# Patient Record
Sex: FEMALE | Race: BLACK OR AFRICAN AMERICAN | NOT HISPANIC OR LATINO
[De-identification: names, ages, dates, MRNs, and addresses within clinical notes are randomized per-mention and may not be internally consistent; named-entity substitution may affect disease eponyms.]

---

## 2017-11-14 ENCOUNTER — APPOINTMENT (OUTPATIENT)
Dept: OBGYN | Facility: CLINIC | Age: 32
End: 2017-11-14
Payer: COMMERCIAL

## 2017-11-14 VITALS
DIASTOLIC BLOOD PRESSURE: 80 MMHG | WEIGHT: 126 LBS | HEIGHT: 63 IN | BODY MASS INDEX: 22.32 KG/M2 | SYSTOLIC BLOOD PRESSURE: 100 MMHG

## 2017-11-14 DIAGNOSIS — D25.9 LEIOMYOMA OF UTERUS, UNSPECIFIED: ICD-10-CM

## 2017-11-14 DIAGNOSIS — Z01.419 ENCOUNTER FOR GYNECOLOGICAL EXAMINATION (GENERAL) (ROUTINE) W/OUT ABNORMAL FINDINGS: ICD-10-CM

## 2017-11-14 LAB
HCG UR QL: NEGATIVE
QUALITY CONTROL: YES

## 2017-11-14 PROCEDURE — 99395 PREV VISIT EST AGE 18-39: CPT

## 2017-11-14 PROCEDURE — 81025 URINE PREGNANCY TEST: CPT

## 2017-11-20 ENCOUNTER — RX RENEWAL (OUTPATIENT)
Age: 32
End: 2017-11-20

## 2018-10-05 ENCOUNTER — OUTPATIENT (OUTPATIENT)
Dept: OUTPATIENT SERVICES | Facility: HOSPITAL | Age: 33
LOS: 1 days | End: 2018-10-05

## 2018-10-05 VITALS
HEIGHT: 62 IN | WEIGHT: 117.07 LBS | SYSTOLIC BLOOD PRESSURE: 104 MMHG | DIASTOLIC BLOOD PRESSURE: 70 MMHG | HEART RATE: 76 BPM | RESPIRATION RATE: 16 BRPM | TEMPERATURE: 97 F

## 2018-10-05 DIAGNOSIS — D25.9 LEIOMYOMA OF UTERUS, UNSPECIFIED: ICD-10-CM

## 2018-10-05 LAB
BLD GP AB SCN SERPL QL: NEGATIVE — SIGNIFICANT CHANGE UP
HCT VFR BLD CALC: 38.4 % — SIGNIFICANT CHANGE UP (ref 34.5–45)
HGB BLD-MCNC: 12.8 G/DL — SIGNIFICANT CHANGE UP (ref 11.5–15.5)
MCHC RBC-ENTMCNC: 30.3 PG — SIGNIFICANT CHANGE UP (ref 27–34)
MCHC RBC-ENTMCNC: 33.3 % — SIGNIFICANT CHANGE UP (ref 32–36)
MCV RBC AUTO: 90.8 FL — SIGNIFICANT CHANGE UP (ref 80–100)
NRBC # FLD: 0 — SIGNIFICANT CHANGE UP
PLATELET # BLD AUTO: 295 K/UL — SIGNIFICANT CHANGE UP (ref 150–400)
PMV BLD: 8.8 FL — SIGNIFICANT CHANGE UP (ref 7–13)
RBC # BLD: 4.23 M/UL — SIGNIFICANT CHANGE UP (ref 3.8–5.2)
RBC # FLD: 12 % — SIGNIFICANT CHANGE UP (ref 10.3–14.5)
RH IG SCN BLD-IMP: POSITIVE — SIGNIFICANT CHANGE UP
WBC # BLD: 5.91 K/UL — SIGNIFICANT CHANGE UP (ref 3.8–10.5)
WBC # FLD AUTO: 5.91 K/UL — SIGNIFICANT CHANGE UP (ref 3.8–10.5)

## 2018-10-05 NOTE — H&P PST ADULT - NEUROLOGICAL DETAILS
alert and oriented x 3/responds to pain/sensation intact/responds to verbal commands/normal strength

## 2018-10-05 NOTE — H&P PST ADULT - PROBLEM SELECTOR PLAN 1
Scheduled for surgery 10/19/18  Preop instructions provided and patient verbalizes understanding.  Labs done and results pending.  Hibiclens and Famotidine provided with instructions.   UCG ordered am DOS

## 2018-10-05 NOTE — H&P PST ADULT - HISTORY OF PRESENT ILLNESS
Pt is a 33 yr old female scheduled for Hysteroscopy Abdominal Myomectomy with Dr Sahu 10/19/18. Pt c/o of uterine fibroids for 4-5 yrs and has increased pressure on bladder and discomfort from increasing size of fibroids. Pt denies bleeding at this time.

## 2018-10-18 ENCOUNTER — TRANSCRIPTION ENCOUNTER (OUTPATIENT)
Age: 33
End: 2018-10-18

## 2018-10-19 ENCOUNTER — INPATIENT (INPATIENT)
Facility: HOSPITAL | Age: 33
LOS: 1 days | Discharge: HOME | End: 2018-10-21
Attending: OBSTETRICS & GYNECOLOGY | Admitting: OBSTETRICS & GYNECOLOGY
Payer: COMMERCIAL

## 2018-10-19 ENCOUNTER — RESULT REVIEW (OUTPATIENT)
Age: 33
End: 2018-10-19

## 2018-10-19 VITALS
RESPIRATION RATE: 14 BRPM | WEIGHT: 117.07 LBS | SYSTOLIC BLOOD PRESSURE: 112 MMHG | HEIGHT: 62 IN | HEART RATE: 76 BPM | DIASTOLIC BLOOD PRESSURE: 67 MMHG | OXYGEN SATURATION: 100 % | TEMPERATURE: 98 F

## 2018-10-19 DIAGNOSIS — D25.9 LEIOMYOMA OF UTERUS, UNSPECIFIED: ICD-10-CM

## 2018-10-19 LAB
HCG UR QL: NEGATIVE — SIGNIFICANT CHANGE UP
RH IG SCN BLD-IMP: POSITIVE — SIGNIFICANT CHANGE UP

## 2018-10-19 PROCEDURE — 88305 TISSUE EXAM BY PATHOLOGIST: CPT | Mod: 26

## 2018-10-19 PROCEDURE — 58140 MYOMECTOMY ABDOM METHOD: CPT | Mod: 82

## 2018-10-19 RX ORDER — HEPARIN SODIUM 5000 [USP'U]/ML
5000 INJECTION INTRAVENOUS; SUBCUTANEOUS EVERY 12 HOURS
Qty: 0 | Refills: 0 | Status: DISCONTINUED | OUTPATIENT
Start: 2018-10-19 | End: 2018-10-21

## 2018-10-19 RX ORDER — SODIUM CHLORIDE 9 MG/ML
1000 INJECTION, SOLUTION INTRAVENOUS
Qty: 0 | Refills: 0 | Status: DISCONTINUED | OUTPATIENT
Start: 2018-10-19 | End: 2018-10-20

## 2018-10-19 RX ORDER — HYDROMORPHONE HYDROCHLORIDE 2 MG/ML
0.5 INJECTION INTRAMUSCULAR; INTRAVENOUS; SUBCUTANEOUS ONCE
Qty: 0 | Refills: 0 | Status: DISCONTINUED | OUTPATIENT
Start: 2018-10-19 | End: 2018-10-19

## 2018-10-19 RX ORDER — ONDANSETRON 8 MG/1
4 TABLET, FILM COATED ORAL EVERY 6 HOURS
Qty: 0 | Refills: 0 | Status: DISCONTINUED | OUTPATIENT
Start: 2018-10-19 | End: 2018-10-21

## 2018-10-19 RX ORDER — HYDROMORPHONE HYDROCHLORIDE 2 MG/ML
0.5 INJECTION INTRAMUSCULAR; INTRAVENOUS; SUBCUTANEOUS
Qty: 0 | Refills: 0 | Status: DISCONTINUED | OUTPATIENT
Start: 2018-10-19 | End: 2018-10-20

## 2018-10-19 RX ORDER — NALOXONE HYDROCHLORIDE 4 MG/.1ML
0.1 SPRAY NASAL
Qty: 0 | Refills: 0 | Status: DISCONTINUED | OUTPATIENT
Start: 2018-10-19 | End: 2018-10-21

## 2018-10-19 RX ORDER — SODIUM CHLORIDE 9 MG/ML
1000 INJECTION, SOLUTION INTRAVENOUS
Qty: 0 | Refills: 0 | Status: DISCONTINUED | OUTPATIENT
Start: 2018-10-19 | End: 2018-10-19

## 2018-10-19 RX ORDER — ONDANSETRON 8 MG/1
4 TABLET, FILM COATED ORAL ONCE
Qty: 0 | Refills: 0 | Status: DISCONTINUED | OUTPATIENT
Start: 2018-10-19 | End: 2018-10-19

## 2018-10-19 RX ORDER — HYDROMORPHONE HYDROCHLORIDE 2 MG/ML
30 INJECTION INTRAMUSCULAR; INTRAVENOUS; SUBCUTANEOUS
Qty: 0 | Refills: 0 | Status: DISCONTINUED | OUTPATIENT
Start: 2018-10-19 | End: 2018-10-20

## 2018-10-19 RX ADMIN — HYDROMORPHONE HYDROCHLORIDE 30 MILLILITER(S): 2 INJECTION INTRAMUSCULAR; INTRAVENOUS; SUBCUTANEOUS at 22:08

## 2018-10-19 RX ADMIN — ONDANSETRON 4 MILLIGRAM(S): 8 TABLET, FILM COATED ORAL at 22:38

## 2018-10-19 RX ADMIN — HYDROMORPHONE HYDROCHLORIDE 0.5 MILLIGRAM(S): 2 INJECTION INTRAMUSCULAR; INTRAVENOUS; SUBCUTANEOUS at 17:15

## 2018-10-19 RX ADMIN — HEPARIN SODIUM 5000 UNIT(S): 5000 INJECTION INTRAVENOUS; SUBCUTANEOUS at 18:26

## 2018-10-19 RX ADMIN — HYDROMORPHONE HYDROCHLORIDE 0.5 MILLIGRAM(S): 2 INJECTION INTRAMUSCULAR; INTRAVENOUS; SUBCUTANEOUS at 17:30

## 2018-10-19 RX ADMIN — SODIUM CHLORIDE 125 MILLILITER(S): 9 INJECTION, SOLUTION INTRAVENOUS at 17:00

## 2018-10-19 RX ADMIN — HYDROMORPHONE HYDROCHLORIDE 30 MILLILITER(S): 2 INJECTION INTRAMUSCULAR; INTRAVENOUS; SUBCUTANEOUS at 17:35

## 2018-10-19 NOTE — PROGRESS NOTE ADULT - SUBJECTIVE AND OBJECTIVE BOX
Subjective  Patient evaluated at bedside. No post operative events.  Pain well controlled.  Tolerating ice-chips.  Not out of bed.  No flatus.  [place] in place.  Patient denies chest pain, shortness of breath, fevers, chills, nausea, vomiting, diarrhea.      heparin  Injectable 5000 Unit(s) SubCutaneous every 12 hours  HYDROmorphone PCA (1 mG/mL) 30 milliLiter(s) PCA Continuous PCA Continuous  HYDROmorphone PCA (1 mG/mL) Rescue Clinician Bolus 0.5 milliGRAM(s) IV Push every 15 minutes PRN  lactated ringers. 1000 milliLiter(s) IV Continuous <Continuous>  naloxone Injectable 0.1 milliGRAM(s) IV Push every 3 minutes PRN  ondansetron Injectable 4 milliGRAM(s) IV Push every 6 hours PRN  ondansetron Injectable 4 milliGRAM(s) IV Push Once PRN      Objective  Physical exam  VS: T(C): 36.9 (10-19-18 @ 22:14), Max: 36.9 (10-19-18 @ 22:14)  HR: 77 (10-19-18 @ 22:14) (71 - 85)  BP: 113/77 (10-19-18 @ 22:14) (96/77 - 118/79)  RR: 16 (10-19-18 @ 22:14) (10 - 19)  SpO2: 100% (10-19-18 @ 22:14) (96% - 100%)  Gen: No acute distress, alert and oriented  CV: Regular rate and rhythm  Pulm: Clear to ascultation bilaterally  Abd: Pfannenstiel incision c/d/i with dermabond in place  Ext: nontender bilaterally, SCDs in place      10-19 @ 07:01  -  10-19 @ 22:23  --------------------------------------------------------  IN: 715 mL / OUT: 400 mL / NET: 315 mL

## 2018-10-20 LAB
BUN SERPL-MCNC: 10 MG/DL — SIGNIFICANT CHANGE UP (ref 7–23)
CALCIUM SERPL-MCNC: 8.8 MG/DL — SIGNIFICANT CHANGE UP (ref 8.4–10.5)
CHLORIDE SERPL-SCNC: 101 MMOL/L — SIGNIFICANT CHANGE UP (ref 98–107)
CO2 SERPL-SCNC: 21 MMOL/L — LOW (ref 22–31)
CREAT SERPL-MCNC: 0.77 MG/DL — SIGNIFICANT CHANGE UP (ref 0.5–1.3)
GLUCOSE SERPL-MCNC: 94 MG/DL — SIGNIFICANT CHANGE UP (ref 70–99)
HCT VFR BLD CALC: 32 % — LOW (ref 34.5–45)
HGB BLD-MCNC: 10.8 G/DL — LOW (ref 11.5–15.5)
MAGNESIUM SERPL-MCNC: 1.6 MG/DL — SIGNIFICANT CHANGE UP (ref 1.6–2.6)
MCHC RBC-ENTMCNC: 29.7 PG — SIGNIFICANT CHANGE UP (ref 27–34)
MCHC RBC-ENTMCNC: 33.8 % — SIGNIFICANT CHANGE UP (ref 32–36)
MCV RBC AUTO: 87.9 FL — SIGNIFICANT CHANGE UP (ref 80–100)
NRBC # FLD: 0 — SIGNIFICANT CHANGE UP
PHOSPHATE SERPL-MCNC: 3.9 MG/DL — SIGNIFICANT CHANGE UP (ref 2.5–4.5)
PLATELET # BLD AUTO: 260 K/UL — SIGNIFICANT CHANGE UP (ref 150–400)
PMV BLD: 8.6 FL — SIGNIFICANT CHANGE UP (ref 7–13)
POTASSIUM SERPL-MCNC: 4.1 MMOL/L — SIGNIFICANT CHANGE UP (ref 3.5–5.3)
POTASSIUM SERPL-SCNC: 4.1 MMOL/L — SIGNIFICANT CHANGE UP (ref 3.5–5.3)
RBC # BLD: 3.64 M/UL — LOW (ref 3.8–5.2)
RBC # FLD: 11.7 % — SIGNIFICANT CHANGE UP (ref 10.3–14.5)
SODIUM SERPL-SCNC: 137 MMOL/L — SIGNIFICANT CHANGE UP (ref 135–145)
WBC # BLD: 15 K/UL — HIGH (ref 3.8–10.5)
WBC # FLD AUTO: 15 K/UL — HIGH (ref 3.8–10.5)

## 2018-10-20 RX ORDER — INFLUENZA VIRUS VACCINE 15; 15; 15; 15 UG/.5ML; UG/.5ML; UG/.5ML; UG/.5ML
0.5 SUSPENSION INTRAMUSCULAR ONCE
Qty: 0 | Refills: 0 | Status: COMPLETED | OUTPATIENT
Start: 2018-10-20 | End: 2018-10-20

## 2018-10-20 RX ORDER — SIMETHICONE 80 MG/1
80 TABLET, CHEWABLE ORAL THREE TIMES A DAY
Qty: 0 | Refills: 0 | Status: DISCONTINUED | OUTPATIENT
Start: 2018-10-20 | End: 2018-10-21

## 2018-10-20 RX ORDER — ACETAMINOPHEN 500 MG
650 TABLET ORAL EVERY 6 HOURS
Qty: 0 | Refills: 0 | Status: DISCONTINUED | OUTPATIENT
Start: 2018-10-20 | End: 2018-10-21

## 2018-10-20 RX ORDER — IBUPROFEN 200 MG
600 TABLET ORAL EVERY 6 HOURS
Qty: 0 | Refills: 0 | Status: DISCONTINUED | OUTPATIENT
Start: 2018-10-20 | End: 2018-10-21

## 2018-10-20 RX ORDER — OXYCODONE HYDROCHLORIDE 5 MG/1
10 TABLET ORAL EVERY 4 HOURS
Qty: 0 | Refills: 0 | Status: DISCONTINUED | OUTPATIENT
Start: 2018-10-20 | End: 2018-10-21

## 2018-10-20 RX ORDER — OXYCODONE HYDROCHLORIDE 5 MG/1
5 TABLET ORAL EVERY 4 HOURS
Qty: 0 | Refills: 0 | Status: DISCONTINUED | OUTPATIENT
Start: 2018-10-20 | End: 2018-10-21

## 2018-10-20 RX ADMIN — Medication 650 MILLIGRAM(S): at 18:47

## 2018-10-20 RX ADMIN — ONDANSETRON 4 MILLIGRAM(S): 8 TABLET, FILM COATED ORAL at 09:44

## 2018-10-20 RX ADMIN — Medication 650 MILLIGRAM(S): at 12:23

## 2018-10-20 RX ADMIN — SIMETHICONE 80 MILLIGRAM(S): 80 TABLET, CHEWABLE ORAL at 22:05

## 2018-10-20 RX ADMIN — HEPARIN SODIUM 5000 UNIT(S): 5000 INJECTION INTRAVENOUS; SUBCUTANEOUS at 18:17

## 2018-10-20 RX ADMIN — HEPARIN SODIUM 5000 UNIT(S): 5000 INJECTION INTRAVENOUS; SUBCUTANEOUS at 05:24

## 2018-10-20 RX ADMIN — HYDROMORPHONE HYDROCHLORIDE 30 MILLILITER(S): 2 INJECTION INTRAMUSCULAR; INTRAVENOUS; SUBCUTANEOUS at 08:26

## 2018-10-20 RX ADMIN — Medication 650 MILLIGRAM(S): at 18:17

## 2018-10-20 RX ADMIN — Medication 600 MILLIGRAM(S): at 12:53

## 2018-10-20 RX ADMIN — Medication 600 MILLIGRAM(S): at 18:47

## 2018-10-20 RX ADMIN — Medication 600 MILLIGRAM(S): at 12:23

## 2018-10-20 RX ADMIN — Medication 600 MILLIGRAM(S): at 18:17

## 2018-10-20 RX ADMIN — Medication 650 MILLIGRAM(S): at 12:53

## 2018-10-20 RX ADMIN — SODIUM CHLORIDE 125 MILLILITER(S): 9 INJECTION, SOLUTION INTRAVENOUS at 08:26

## 2018-10-20 NOTE — PROGRESS NOTE ADULT - SUBJECTIVE AND OBJECTIVE BOX
POD#   HD#    Patient seen and examined at bedside, no acute overnight events. No acute complaints, pain well controlled.  Patient is ambulating, passing flatus, voiding spontaneously, and tolerating regular diet. Denies CP, SOB, N/V, fevers, and chills.    Vital Signs Last 24 Hours  T(C): 36.8 (10-20-18 @ 05:23), Max: 36.9 (10-19-18 @ 22:14)  HR: 84 (10-20-18 @ 05:23) (71 - 85)  BP: 96/57 (10-20-18 @ 05:23) (93/59 - 118/79)  RR: 18 (10-20-18 @ 05:23) (10 - 19)  SpO2: 100% (10-20-18 @ 05:23) (96% - 100%)    I&O's Summary    19 Oct 2018 07:01  -  20 Oct 2018 07:00  --------------------------------------------------------  IN: 715 mL / OUT: 700 mL / NET: 15 mL        Physical Exam:  General: NAD  CV: NR, RR, S1, S2, no M/R/G  Lungs: CTA-B  Abdomen: Soft, non-tender, non-distended, +BS  Incision: _ CDI  Ext: No pain or swelling    Labs:                Blood Type: O Positive      MEDICATIONS  (STANDING):  heparin  Injectable 5000 Unit(s) SubCutaneous every 12 hours  HYDROmorphone PCA (1 mG/mL) 30 milliLiter(s) PCA Continuous PCA Continuous  influenza   Vaccine 0.5 milliLiter(s) IntraMuscular once  lactated ringers. 1000 milliLiter(s) (125 mL/Hr) IV Continuous <Continuous>    MEDICATIONS  (PRN):  HYDROmorphone PCA (1 mG/mL) Rescue Clinician Bolus 0.5 milliGRAM(s) IV Push every 15 minutes PRN for Pain Scale GREATER THAN 6  naloxone Injectable 0.1 milliGRAM(s) IV Push every 3 minutes PRN For ANY of the following changes in patient status:  A. RR LESS THAN 10 breaths per minute, B. Oxygen saturation LESS THAN 90%, C. Sedation score of 6  ondansetron Injectable 4 milliGRAM(s) IV Push every 6 hours PRN Nausea R2 GYN PROGRESS NOTE    POD#1   HD#2    SUBJECTIVE:  Patient seen and examined at bedside, no acute overnight events. No acute complaints, pain well controlled.  Patient is out of bed to commode, voiding spontaneously, and tolerating regular diet. Denies CP, SOB, N/V, fevers, and chills.    OBJECTIVE:  Vital Signs Last 24 Hours  T(C): 36.8 (10-20-18 @ 05:23), Max: 36.9 (10-19-18 @ 22:14)  HR: 84 (10-20-18 @ 05:23) (71 - 85)  BP: 96/57 (10-20-18 @ 05:23) (93/59 - 118/79)  RR: 18 (10-20-18 @ 05:23) (10 - 19)  SpO2: 100% (10-20-18 @ 05:23) (96% - 100%)    I&O's Summary    19 Oct 2018 07:01  -  20 Oct 2018 07:00  --------------------------------------------------------  IN: 715 mL / OUT: 700 mL / NET: 15 mL    Physical Exam:  General: NAD  CV: NR, RR, S1, S2, no M/R/G  Lungs: CTA-B  Abdomen: Soft, non-distended, +BS, appropriately tender  Incision: pfannensteil incision c/d/i  Ext: No pain or swelling    Labs:                          10.8   15.00 )-----------( 260      ( 20 Oct 2018 07:40 )             32.0       10-20  137  |  101  |  10  ----------------------------<  94  4.1   |  21<L>  |  0.77    Ca    8.8      20 Oct 2018 07:40  Phos  3.9     10-20  Mg     1.6     10-20    Blood Type: O Positive      MEDICATIONS  (STANDING):  heparin  Injectable 5000 Unit(s) SubCutaneous every 12 hours  HYDROmorphone PCA (1 mG/mL) 30 milliLiter(s) PCA Continuous PCA Continuous  influenza   Vaccine 0.5 milliLiter(s) IntraMuscular once  lactated ringers. 1000 milliLiter(s) (125 mL/Hr) IV Continuous <Continuous>    MEDICATIONS  (PRN):  HYDROmorphone PCA (1 mG/mL) Rescue Clinician Bolus 0.5 milliGRAM(s) IV Push every 15 minutes PRN for Pain Scale GREATER THAN 6  naloxone Injectable 0.1 milliGRAM(s) IV Push every 3 minutes PRN For ANY of the following changes in patient status:  A. RR LESS THAN 10 breaths per minute, B. Oxygen saturation LESS THAN 90%, C. Sedation score of 6  ondansetron Injectable 4 milliGRAM(s) IV Push every 6 hours PRN Nausea

## 2018-10-20 NOTE — PROGRESS NOTE ADULT - SUBJECTIVE AND OBJECTIVE BOX
Anesthesia Pain Management Service    SUBJECTIVE: Patient is doing well with IV PCA and no significant problems reported.    Pain Scale Score	At rest: ___ 	With Activity: ___ 	[X ] Refer to charted pain scores    THERAPY:    [ ] IV PCA Morphine		[ ] 5 mg/mL	[ ] 1 mg/mL  [X ] IV PCA Hydromorphone	[ ] 5 mg/mL	[X ] 1 mg/mL  [ ] IV PCA Fentanyl		[ ] 50 micrograms/mL    Demand dose __0.2_ lockout __6_ (minutes) Continuous Rate _0__ Total: __7.2_  mg used (in past 24 hours)      MEDICATIONS  (STANDING):  acetaminophen   Tablet .. 650 milliGRAM(s) Oral every 6 hours  heparin  Injectable 5000 Unit(s) SubCutaneous every 12 hours  HYDROmorphone PCA (1 mG/mL) 30 milliLiter(s) PCA Continuous PCA Continuous  influenza   Vaccine 0.5 milliLiter(s) IntraMuscular once  lactated ringers. 1000 milliLiter(s) (125 mL/Hr) IV Continuous <Continuous>    MEDICATIONS  (PRN):  HYDROmorphone PCA (1 mG/mL) Rescue Clinician Bolus 0.5 milliGRAM(s) IV Push every 15 minutes PRN for Pain Scale GREATER THAN 6  naloxone Injectable 0.1 milliGRAM(s) IV Push every 3 minutes PRN For ANY of the following changes in patient status:  A. RR LESS THAN 10 breaths per minute, B. Oxygen saturation LESS THAN 90%, C. Sedation score of 6  ondansetron Injectable 4 milliGRAM(s) IV Push every 6 hours PRN Nausea      OBJECTIVE:    Sedation Score:	[ X] Alert	[ ] Drowsy 	[ ] Arousable	[ ] Asleep	[ ] Unresponsive    Side Effects:	[X ] None	[ ] Nausea	[ ] Vomiting	[ ] Pruritus  		[ ] Other:    Vital Signs Last 24 Hrs  T(C): 36.8 (20 Oct 2018 10:37), Max: 36.9 (19 Oct 2018 22:14)  T(F): 98.2 (20 Oct 2018 10:37), Max: 98.4 (19 Oct 2018 22:14)  HR: 74 (20 Oct 2018 10:37) (71 - 85)  BP: 90/54 (20 Oct 2018 10:37) (90/54 - 118/79)  BP(mean): 75 (19 Oct 2018 18:00) (75 - 75)  RR: 17 (20 Oct 2018 10:37) (10 - 19)  SpO2: 100% (20 Oct 2018 10:37) (96% - 100%)    ASSESSMENT/ PLAN    Therapy to  be:	[ X] Continue   [ ] Discontinued   [ ] Change to prn Analgesics    Documentation and Verification of current medications:   [X] Done	[ ] Not done, not elligible    Comments: Pt wants to keep IV PCA for now.    Progress Note written now but Patient was seen earlier.

## 2018-10-20 NOTE — PROGRESS NOTE ADULT - SUBJECTIVE AND OBJECTIVE BOX
ANESTHESIA POSTOP CHECK    33y Female POSTOP DAY 1 S/P     Vital Signs Last 24 Hrs  T(C): 36.8 (20 Oct 2018 05:23), Max: 36.9 (19 Oct 2018 22:14)  T(F): 98.3 (20 Oct 2018 05:23), Max: 98.4 (19 Oct 2018 22:14)  HR: 84 (20 Oct 2018 05:23) (71 - 85)  BP: 96/57 (20 Oct 2018 05:23) (93/59 - 118/79)  BP(mean): 75 (19 Oct 2018 18:00) (75 - 75)  RR: 18 (20 Oct 2018 05:23) (10 - 19)  SpO2: 100% (20 Oct 2018 05:23) (96% - 100%)  I&O's Summary    19 Oct 2018 07:01  -  20 Oct 2018 07:00  --------------------------------------------------------  IN: 715 mL / OUT: 700 mL / NET: 15 mL        [x ] NO APPARENT ANESTHESIA COMPLICATIONS      Comments:

## 2018-10-20 NOTE — PROGRESS NOTE ADULT - SUBJECTIVE AND OBJECTIVE BOX
ANESTHESIA POSTOP CHECK    33y Female POSTOP DAY 1 S/P     Vital Signs Last 24 Hrs  T(C): 36.8 (20 Oct 2018 10:37), Max: 36.9 (19 Oct 2018 22:14)  T(F): 98.2 (20 Oct 2018 10:37), Max: 98.4 (19 Oct 2018 22:14)  HR: 74 (20 Oct 2018 10:37) (71 - 85)  BP: 90/54 (20 Oct 2018 10:37) (90/54 - 118/79)  BP(mean): 75 (19 Oct 2018 18:00) (75 - 75)  RR: 17 (20 Oct 2018 10:37) (10 - 19)  SpO2: 100% (20 Oct 2018 10:37) (96% - 100%)  I&O's Summary    19 Oct 2018 07:01  -  20 Oct 2018 07:00  --------------------------------------------------------  IN: 715 mL / OUT: 700 mL / NET: 15 mL        [X ] NO APPARENT ANESTHESIA COMPLICATIONS      Comments:

## 2018-10-21 ENCOUNTER — TRANSCRIPTION ENCOUNTER (OUTPATIENT)
Age: 33
End: 2018-10-21

## 2018-10-21 VITALS
DIASTOLIC BLOOD PRESSURE: 64 MMHG | TEMPERATURE: 98 F | HEART RATE: 81 BPM | SYSTOLIC BLOOD PRESSURE: 107 MMHG | RESPIRATION RATE: 18 BRPM | OXYGEN SATURATION: 100 %

## 2018-10-21 DIAGNOSIS — Z98.890 OTHER SPECIFIED POSTPROCEDURAL STATES: ICD-10-CM

## 2018-10-21 RX ORDER — ACETAMINOPHEN 500 MG
2 TABLET ORAL
Qty: 0 | Refills: 0 | COMMUNITY
Start: 2018-10-21

## 2018-10-21 RX ORDER — NORETHINDRONE 0.35 MG/1
1 TABLET ORAL
Qty: 0 | Refills: 0 | COMMUNITY

## 2018-10-21 RX ORDER — OXYCODONE HYDROCHLORIDE 5 MG/1
1 TABLET ORAL
Qty: 15 | Refills: 0 | OUTPATIENT
Start: 2018-10-21

## 2018-10-21 RX ORDER — IBUPROFEN 200 MG
1 TABLET ORAL
Qty: 0 | Refills: 0 | COMMUNITY
Start: 2018-10-21

## 2018-10-21 RX ADMIN — Medication 650 MILLIGRAM(S): at 08:00

## 2018-10-21 RX ADMIN — Medication 600 MILLIGRAM(S): at 00:10

## 2018-10-21 RX ADMIN — Medication 650 MILLIGRAM(S): at 00:10

## 2018-10-21 RX ADMIN — Medication 600 MILLIGRAM(S): at 01:00

## 2018-10-21 RX ADMIN — Medication 650 MILLIGRAM(S): at 07:00

## 2018-10-21 RX ADMIN — Medication 600 MILLIGRAM(S): at 08:00

## 2018-10-21 RX ADMIN — Medication 650 MILLIGRAM(S): at 01:00

## 2018-10-21 RX ADMIN — SIMETHICONE 80 MILLIGRAM(S): 80 TABLET, CHEWABLE ORAL at 07:01

## 2018-10-21 RX ADMIN — Medication 600 MILLIGRAM(S): at 13:05

## 2018-10-21 RX ADMIN — Medication 600 MILLIGRAM(S): at 07:01

## 2018-10-21 RX ADMIN — Medication 650 MILLIGRAM(S): at 13:05

## 2018-10-21 RX ADMIN — SIMETHICONE 80 MILLIGRAM(S): 80 TABLET, CHEWABLE ORAL at 13:06

## 2018-10-21 NOTE — PROGRESS NOTE ADULT - PROBLEM SELECTOR PLAN 1
NEURO: continue PO pain meds  CV: vss, Hct trend 38.4 -> 32.0  PULM: satting well on RA, lungs clear, increase incentive spirometry and ambulation  GI: continue regular diet, SLIV  : cohn out; pt voiding  HEME: continue scds, HSQ  ID: afebrile  DISPO: likely discharge today    KYLIE Gan MD PGY2.
NEURO: d/c PCA, transition to PO pain meds  CV: vss, Hct trend 38.4 -> 32.0  PULM: satting well on RA, lungs clear, increase incentive spirometry and ambulation  GI: continue regular diet, SLIV for good po/voiding  : cohn out; pt voiding  HEME: continue scds, HSQ  ID: aferile  DISPO: continue routien postop care    KYLIE Gan MD PGY2

## 2018-10-21 NOTE — DISCHARGE NOTE ADULT - CARE PROVIDER_API CALL
Constanza Sahu), Obstetrics and Gynecology  [address]  [address]  [place] [address]  Phone: [phone]  Fax: [phone]

## 2018-10-21 NOTE — PROGRESS NOTE ADULT - ASSESSMENT
33y F s/p hysteroscopy, abdominal myomectomy.  Patient is stable and progressing normally postop, meeting all milestones.
A/P  33yF POD#0 s/p hysteroscopy, abdominal myomectomy, currently meeting appropriate post op milestones    Neuro: Pain controlled with PCA  CV: Hemodynamically stable, follow up AM CBC  Pulm: Oxygenating well on room air, continue IS  GI: Tolerating ice chips, will advance to regular diet  : Adequate UOP, continue to monitor I's/O's.  Discontinuing cohn.  Follow up BMP/Mg/Phos  ID: afebrile, WBC stable, follow up AM CBC  Heme: DVT prophylaxis with HSQ, early ambulation and SCDs    Shalnoda Deleon PGY4  [id number]
33y now s/p hysteroscopy abdominal myomectomy (ebl 50); pt is progressing normally postop.

## 2018-10-21 NOTE — DISCHARGE NOTE ADULT - ADDITIONAL INSTRUCTIONS
Follow up with Dr. Sahu in two weeks for your postop checkup.  Call the office at [phone] for your appointment.

## 2018-10-21 NOTE — DISCHARGE NOTE ADULT - MEDICATION SUMMARY - MEDICATIONS TO TAKE
I will START or STAY ON the medications listed below when I get home from the hospital:    acetaminophen 325 mg oral tablet  -- 2 tab(s) by mouth every 6 hours  -- Indication: For mild pain    ibuprofen 600 mg oral tablet  -- 1 tab(s) by mouth every 6 hours  -- Indication: For moderate pain    oxyCODONE 5 mg oral tablet  -- 1 tab(s) by mouth 4 times a day, As Needed -for severe pain MDD:4 tabs   -- Caution federal law prohibits the transfer of this drug to any person other  than the person for whom it was prescribed.  It is very important that you take or use this exactly as directed.  Do not skip doses or discontinue unless directed by your doctor.  May cause drowsiness.  Alcohol may intensify this effect.  Use care when operating dangerous machinery.  This prescription cannot be refilled.  Using more of this medication than prescribed may cause serious breathing problems.    -- Indication: For Severe pain

## 2018-10-21 NOTE — DISCHARGE NOTE ADULT - HOSPITAL COURSE
Patient had uncomplicated hysteroscopy and abdominal myomectomy on 10/19/18.  EBL was 200cc.  Hct trend: 38.4->32.0.  Postoperative course was uncomplicated.  On the day of discharge the patient was stable and afebrile, voiding spontaneously, tolerating regular diet, ambulating at baseline and had pain well controlled with oral pain medications.  Patient to have close follow up with Dr. Sahu outpatient. Patient had uncomplicated hysteroscopy and abdominal myomectomy on 10/19/18.  EBL was 50cc.  Hct trend: 38.4->32.0.  Postoperative course was uncomplicated.  On the day of discharge the patient was stable and afebrile, voiding spontaneously, tolerating regular diet, ambulating at baseline and had pain well controlled with oral pain medications.  Patient to have close follow up with Dr. Sahu outpatient.

## 2018-10-21 NOTE — PROGRESS NOTE ADULT - SUBJECTIVE AND OBJECTIVE BOX
R2 GYN PROGRESS NOTE    POD#2   HD#3    SUBJECTIVE:    Patient seen and examined at bedside, no acute overnight events. No acute complaints, pain well controlled.  Patient is ambulating, passing flatus, voiding spontaneously, and tolerating regular diet. Denies CP, SOB, N/V, fevers, and chills.    OBJECTIVE:  Vital Signs Last 24 Hours  T(C): 36.8 (10-21-18 @ 06:57), Max: 37.3 (10-21-18 @ 01:31)  HR: 75 (10-21-18 @ 06:57) (74 - 88)  BP: 104/57 (10-21-18 @ 06:57) (90/54 - 105/71)  RR: 18 (10-21-18 @ 06:57) (16 - 18)  SpO2: 98% (10-21-18 @ 06:57) (98% - 100%)    I&O's Summary    20 Oct 2018 07:01  -  21 Oct 2018 07:00  --------------------------------------------------------  IN: 0 mL / OUT: 0 mL / NET: 0 mL    Physical Exam:  General: NAD  CV: NR, RR, S1, S2, no M/R/G  Lungs: CTA-B  Abdomen: Soft, non-tender, non-distended, +BS  Incision: pfannensteil incision c/d/i w/ dermabond in place  Ext: No pain or swelling    Labs:                        10.8   15.00 )-----------( 260      ( 20 Oct 2018 07:40 )             32.0        10-20    137  |  101  |  10  ----------------------------<  94  4.1   |  21<L>  |  0.77    Ca    8.8      20 Oct 2018 07:40  Phos  3.9     10-20  Mg     1.6     10-20    Blood Type: O Positive    MEDICATIONS  (STANDING):  acetaminophen   Tablet .. 650 milliGRAM(s) Oral every 6 hours  heparin  Injectable 5000 Unit(s) SubCutaneous every 12 hours  ibuprofen  Tablet. 600 milliGRAM(s) Oral every 6 hours  influenza   Vaccine 0.5 milliLiter(s) IntraMuscular once  simethicone 80 milliGRAM(s) Chew three times a day    MEDICATIONS  (PRN):  naloxone Injectable 0.1 milliGRAM(s) IV Push every 3 minutes PRN For ANY of the following changes in patient status:  A. RR LESS THAN 10 breaths per minute, B. Oxygen saturation LESS THAN 90%, C. Sedation score of 6  ondansetron Injectable 4 milliGRAM(s) IV Push every 6 hours PRN Nausea  oxyCODONE    IR 5 milliGRAM(s) Oral every 4 hours PRN Moderate Pain (4 - 6)  oxyCODONE    IR 10 milliGRAM(s) Oral every 4 hours PRN Severe Pain (7 - 10)

## 2018-10-21 NOTE — DISCHARGE NOTE ADULT - CARE PROVIDERS DIRECT ADDRESSES
[email or web address]

## 2018-10-21 NOTE — DISCHARGE NOTE ADULT - CARE PLAN
Principal Discharge DX:	S/P myomectomy  Goal:	Recovery  Assessment and plan of treatment:	1. Nothing in the vagina for 6 weeks including no tampons, no douching, no tub baths and no intercourse.  2. Please call the office or go to the Emergency Room if you have any of the following: fever over 100.4F, pain not controlled by oral pain medications, difficulty urinating, severe vaginal bleeding more than 1 pad per hour, or for any other concerns.

## 2018-10-21 NOTE — DISCHARGE NOTE ADULT - PLAN OF CARE
Recovery 1. Nothing in the vagina for 6 weeks including no tampons, no douching, no tub baths and no intercourse.  2. Please call the office or go to the Emergency Room if you have any of the following: fever over 100.4F, pain not controlled by oral pain medications, difficulty urinating, severe vaginal bleeding more than 1 pad per hour, or for any other concerns.

## 2018-10-21 NOTE — DISCHARGE NOTE ADULT - PATIENT PORTAL LINK FT
You can access the [facility] Patient Portal, offered by [facility], by registering with the following website: [email or web address]

## 2018-10-24 LAB — SURGICAL PATHOLOGY STUDY: SIGNIFICANT CHANGE UP

## 2018-11-07 ENCOUNTER — TRANSCRIPTION ENCOUNTER (OUTPATIENT)
Age: 33
End: 2018-11-07

## 2021-07-24 NOTE — H&P PST ADULT - NS PRO LAST MENSTRUAL DATE
Hospitalist Progress Note    NAME: Jarrett Andrews   :  1983   MRN:  [id number]       Assessment / Plan:  Acute hypoxic respiratory failure POA  Covid-19 PNA  -S/p IV remdesivir  -cont IV Decadron  -On HFNC O2 support, weaning down, currently on 12 L  -Patient no distress, speaking full sentences no accessory muscle use  -Continue PT/OT  -Sinus tachycardia 2/2 respiratory issues, had brief episodic bradycardia few days ago, likely vagally mediated  -Mobilize  -We will need O2 screening prior to DC  -Continue to wean O2 as able maintaining sats > 90  -will need ambulatory O2 screening prior to DC    Obesity  Body mass index is 47.65 kg/m². -weight loss counseling    Estimated discharge date:     Code status: Full  Prophylaxis: Lovenox     Subjective:     Chief Complaint / Reason for Physician Visit  Weaning O2, maintaining sats at rest on 6L during rounds    discussed with RN events overnight. Review of Systems:  Symptom Y/N Comments  Symptom Y/N Comments   Fever/Chills n   Chest Pain n    Poor Appetite n   Edema     Cough n   Abdominal Pain n    Sputum n   Joint Pain     SOB/BROOKS n   Pruritis/Rash     Nausea/vomit n   Tolerating PT/OT     Diarrhea    Tolerating Diet y    Constipation    Other       Could NOT obtain due to:      Objective:     VITALS:   Last 24hrs VS reviewed since prior progress note.  Most recent are:  Patient Vitals for the past 24 hrs:   Temp Pulse Resp BP SpO2   21 1100 98.6 °F (37 °C) (!) 113 29 114/74 97 %   21 0838     95 %   21 0833     96 %   21 0831     95 %   21 0530  94 30 (!) 104/57 96 %   21 0410 97.9 °F (36.6 °C) (!) 101 (!) 38 (!) 104/57 97 %   21 0338     91 %   21 0010  70 22  98 %   21 2310  90 (!) 32  99 %   21 2225 98.4 °F (36.9 °C) 80 26 115/82 96 %   21 2210  77 (!) 34  99 %   210  86 26  98 %   21     97 %   21  94 (!) 35  99 %   07/23/21 1946 98.4 °F (36.9 °C) (!) 107 30 122/81 98 %   07/23/21 1810  100 (!) 33  93 %   07/23/21 1718 98.7 °F (37.1 °C) (!) 115 28 107/84 94 %   07/23/21 1610  (!) 121 (!) 37  97 %   07/23/21 1517     98 %   07/23/21 1510  90 27  98 %   07/23/21 1410  100 (!) 32  97 %   07/23/21 1400  (!) 104 (!) 31  96 %   07/23/21 1333     98 %   07/23/21 1310  79 21  99 %   07/23/21 1300  67 18 129/69 99 %       Intake/Output Summary (Last 24 hours) at 7/24/2021 1218  Last data filed at 7/23/2021 1946  Gross per 24 hour   Intake 650 ml   Output 1 ml   Net 649 ml        I had a face to face encounter and independently examined this patient on 7/24/2021, as outlined below:  PHYSICAL EXAM:  General: WD, WN. Alert, cooperative, no acute distress    EENT:  EOMI. Anicteric sclerae. MMM  Resp:  CTA bilaterally, no wheezing or rales. No accessory muscle use  CV:  Regular  rhythm,  No edema  GI:  Soft, Non distended, Non tender. +Bowel sounds  Neurologic:  Alert and oriented X 3, normal speech,   Psych:   Good insight. Not anxious nor agitated  Skin:  No rashes. No jaundice    Reviewed most current lab test results and cultures  YES  Reviewed most current radiology test results   YES  Review and summation of old records today    NO  Reviewed patient's current orders and MAR    YES  PMH/SH reviewed - no change compared to H&P  ________________________________________________________________________  Care Plan discussed with:    Comments   Patient x    Family      RN x    Care Manager     Consultant                        Multidiciplinary team rounds were held today with , nursing, pharmacist and clinical coordinator. Patient's plan of care was discussed; medications were reviewed and discharge planning was addressed.      ________________________________________________________________________  Total NON critical care TIME:  35   Minutes    Total CRITICAL CARE TIME Spent:   Minutes non procedure based      Comments   >50% of visit spent in counseling and coordination of care x    ________________________________________________________________________  Ravi Cheek DO     Procedures: see electronic medical records for all procedures/Xrays and details which were not copied into this note but were reviewed prior to creation of Plan. LABS:  I reviewed today's most current labs and imaging studies.   Pertinent labs include:  Recent Labs     07/24/21 0349 07/23/21 0349   WBC 12.8* 8.3   HGB 14.8 12.8   HCT 46.8 40.2    322     Recent Labs     07/24/21 0349 07/23/21 0349 07/22/21  0630    142 142   K 4.1 3.4* 3.8    108 107   CO2 30 28 28   * 138* 98   BUN 16 16 16   CREA 0.77 0.71 0.80   CA 9.1 7.5* 8.8   MG 2.2 2.4  --    PHOS 5.0* 3.7  --    ALB  --   --  3.2*   TBILI  --   --  0.5   ALT  --   --  138*       Signed: Ravi Cheek DO LMP 9/18/18 - on OC

## 2024-02-07 NOTE — PATIENT PROFILE ADULT - NSPROALCOHOLUSE2_GEN_A_NUR
[FreeTextEntry3] : All medical record entries made by the Scribe were at my, Dr. Lauren Shikowitz-Behr, MD, direction and personally dictated by me on 02/06/2024. I have reviewed the chart and agree that the record accurately reflects my personal performance of the history, physical exam, assessment and plan. I have also personally directed, reviewed, and agreed with the chart. yes

## 2024-08-13 NOTE — H&P PST ADULT - BP NONINVASIVE SYSTOLIC (MM HG)
Thank you for seeking care at [facility] Emergency Department.  You have been seen and evaluated.We reviewed the results from your visit in the emergency department. You were found to have an urinary tract infection.  Please read the instructions provided, and if given prescriptions, take as instructed.     Remember, your care process does not end after your visit today. Please follow-up with your doctor within 1-2 days for a follow-up check to ensure you are  improving, to see if you need any further evaluation/testing, or to evaluate for any alternate diagnoses.     You should return to the emergency department if you develop severe nausea and vomiting AND are unable to keep liquids down, if you develop severe back/flank or stomach pain, or if your symptoms are not clearly improving at home.     We hope you feel better.    
104